# Patient Record
Sex: FEMALE | Race: WHITE | NOT HISPANIC OR LATINO | Employment: OTHER | URBAN - METROPOLITAN AREA
[De-identification: names, ages, dates, MRNs, and addresses within clinical notes are randomized per-mention and may not be internally consistent; named-entity substitution may affect disease eponyms.]

---

## 2020-10-20 ENCOUNTER — OFFICE VISIT (OUTPATIENT)
Dept: CARDIOLOGY | Facility: CLINIC | Age: 30
End: 2020-10-20

## 2020-10-20 VITALS
HEART RATE: 73 BPM | BODY MASS INDEX: 26.49 KG/M2 | WEIGHT: 159 LBS | SYSTOLIC BLOOD PRESSURE: 123 MMHG | DIASTOLIC BLOOD PRESSURE: 80 MMHG | HEIGHT: 65 IN

## 2020-10-20 DIAGNOSIS — R94.31 ABNORMAL EKG: Primary | ICD-10-CM

## 2020-10-20 PROCEDURE — 99203 OFFICE O/P NEW LOW 30 MIN: CPT | Performed by: INTERNAL MEDICINE

## 2020-10-20 PROCEDURE — 93000 ELECTROCARDIOGRAM COMPLETE: CPT | Performed by: INTERNAL MEDICINE

## 2020-10-20 RX ORDER — BIOTIN 10 MG
1 TABLET ORAL DAILY
COMMUNITY

## 2020-10-20 RX ORDER — DIMENHYDRINATE 50 MG
TABLET ORAL
COMMUNITY

## 2020-10-20 NOTE — PROGRESS NOTES
Subjective:     Encounter Date:10/20/2020      Patient ID: Fiorella Melendez is a 30 y.o. female.    Chief Complaint:  Chief Complaint   Patient presents with   • Abnormal ECG       HPI:  History of Present Illness     I had the pleasure of seeing Ms. Melendez in the office today.  She is a 30-year-old female who appears to be in excellent health.  She recently went to a weight loss center to discuss dietary changes and potential supplements to improve her eating habits.  An EKG was performed and there were some abnormalities noted.  The question appeared to be (according to the patient) WPW.  Ms. Melendez has no cardiac risk factors.  She denies symptoms of chest discomfort or shortness of air.  She denies palpitations, dizziness or near syncope.  She has no lower extremity edema, orthopnea or paroxysmal nocturnal dyspnea.  She states that she runs 2 to 3 miles per week without difficulty.  She works out on a sta[employer]ricentis and a high level of exercise without difficulty.  Her EKG in the office today reveals normal sinus rhythm with some very nonspecific T wave abnormalities.  Overall the EKG appears to be essentially normal.  There is no evidence of WPW on the current EKG.  The following portions of the patient's history were reviewed and updated as appropriate: allergies, current medications, past family history, past medical history, past social history, past surgical history and problem list.    Problem List:  Patient Active Problem List   Diagnosis   • Abnormal EKG       Past Medical History:  History reviewed. No pertinent past medical history.    Past Surgical History:  Past Surgical History:   Procedure Laterality Date   • BREAST AUGMENTATION     • OOPHORECTOMY         Social History:  Social History     Socioeconomic History   • Marital status:      Spouse name: Not on file   • Number of children: Not on file   • Years of education: Not on file   • Highest education level: Not on file   Tobacco Use   •  Smoking status: Former Smoker   • Smokeless tobacco: Never Used   Substance and Sexual Activity   • Alcohol use: Yes     Frequency: 2-4 times a month   • Drug use: Defer   • Sexual activity: Defer       Allergies:  Allergies   Allergen Reactions   • Amoxicillin-Pot Clavulanate Nausea And Vomiting     FEVER   • Oxycodone-Acetaminophen Nausea And Vomiting and Rash       Immunizations:    There is no immunization history on file for this patient.    ROS:  Review of Systems   Constitution: Negative for chills, decreased appetite, fever, malaise/fatigue, weight gain and weight loss.   HENT: Negative for congestion, hoarse voice, nosebleeds and sore throat.    Eyes: Negative for blurred vision, double vision and visual disturbance.   Cardiovascular: Negative for chest pain, claudication, dyspnea on exertion, irregular heartbeat, leg swelling, near-syncope, orthopnea, palpitations, paroxysmal nocturnal dyspnea and syncope.   Respiratory: Negative for cough, hemoptysis, shortness of breath, sleep disturbances due to breathing, snoring, sputum production and wheezing.    Endocrine: Negative for cold intolerance, heat intolerance, polydipsia and polyuria.   Hematologic/Lymphatic: Negative for adenopathy and bleeding problem. Does not bruise/bleed easily.   Skin: Negative for flushing, itching, nail changes and rash.   Musculoskeletal: Negative for arthritis, back pain, joint pain, muscle cramps, muscle weakness, myalgias and neck pain.   Gastrointestinal: Negative for bloating, abdominal pain, anorexia, change in bowel habit, constipation, diarrhea, heartburn, hematemesis, hematochezia, jaundice, melena, nausea and vomiting.   Genitourinary: Negative for dysuria, hematuria and nocturia.   Neurological: Negative for brief paralysis, disturbances in coordination, excessive daytime sleepiness, dizziness, headaches, light-headedness, loss of balance, numbness, paresthesias, seizures and vertigo.   Psychiatric/Behavioral: Negative  "for altered mental status and depression. The patient is not nervous/anxious.    Allergic/Immunologic: Negative for environmental allergies and hives.          Objective:         /80   Pulse 73   Ht 165.1 cm (65\")   Wt 72.1 kg (159 lb)   BMI 26.46 kg/m²     Constitutional:       Appearance: Healthy appearance. Not in distress.   Neck:      Musculoskeletal: Neck supple.      Thyroid: Thyroid normal.      Vascular: No JVR. JVD normal.   Pulmonary:      Effort: Pulmonary effort is normal.      Breath sounds: Normal breath sounds. No wheezing. No rhonchi. No rales.   Chest:      Chest wall: Not tender to palpatation.   Cardiovascular:      PMI at left midclavicular line. Normal rate. Regular rhythm. Normal S1. Normal S2.      Murmurs: There is no murmur.      No gallop. No click. No rub.   Pulses:     Intact distal pulses.   Edema:     Peripheral edema absent.   Abdominal:      General: Bowel sounds are normal. There is no distension.      Palpations: Abdomen is soft. There is no hepatomegaly.      Tenderness: There is no abdominal tenderness.   Musculoskeletal: Normal range of motion.         General: No tenderness.   Skin:     General: Skin is warm and dry.   Neurological:      General: No focal deficit present.      Mental Status: Alert and oriented to person, place and time.         In-Office Procedure(s):    ECG 12 Lead    Date/Time: 10/20/2020 4:28 PM  Performed by: Bianka Jordan MD  Authorized by: Bianka Jordan MD   Previous ECG: no previous ECG available  Comments: Normal sinus rhythm.  Essentially normal EKG            ASCVD RIsk Score::  The ASCVD Risk score (Shanda DC Jr., et al., 2013) failed to calculate for the following reasons:    The 2013 ASCVD risk score is only valid for ages 40 to 79    Recent Radiology:  Imaging Results (Most Recent)     None          Lab Review:   not applicable             Assessment:          Diagnosis Plan   1. Abnormal EKG            Plan:      Ms. Melendez does " not have any cardiac complaints.  Her EKG is unremarkable.  She does not need further cardiac work-up unless symptoms develop.  We have discussed ongoing cardiac risk factor modification.  She is going to continue to exercise and use dietary discretion.      Level of Care:                 Bianka Jordan MD  10/20/20  .

## 2021-04-16 ENCOUNTER — BULK ORDERING (OUTPATIENT)
Dept: CASE MANAGEMENT | Facility: OTHER | Age: 31
End: 2021-04-16

## 2021-04-16 DIAGNOSIS — Z23 IMMUNIZATION DUE: ICD-10-CM
